# Patient Record
Sex: FEMALE | Race: WHITE | NOT HISPANIC OR LATINO | URBAN - METROPOLITAN AREA
[De-identification: names, ages, dates, MRNs, and addresses within clinical notes are randomized per-mention and may not be internally consistent; named-entity substitution may affect disease eponyms.]

---

## 2020-07-02 ENCOUNTER — LAB OUTSIDE AN ENCOUNTER (OUTPATIENT)
Dept: URBAN - METROPOLITAN AREA CLINIC 78 | Facility: CLINIC | Age: 45
End: 2020-07-02

## 2020-07-02 ENCOUNTER — OFFICE VISIT (OUTPATIENT)
Dept: URBAN - METROPOLITAN AREA CLINIC 78 | Facility: CLINIC | Age: 45
End: 2020-07-02
Payer: COMMERCIAL

## 2020-07-02 DIAGNOSIS — K12.1 MOUTH ULCERS: ICD-10-CM

## 2020-07-02 DIAGNOSIS — R10.9 ABDOMINAL CRAMPS: ICD-10-CM

## 2020-07-02 DIAGNOSIS — R14.0 BLOATING: ICD-10-CM

## 2020-07-02 DIAGNOSIS — R15.9 FECAL INCONTINENCE: ICD-10-CM

## 2020-07-02 DIAGNOSIS — R12 HEARTBURN: ICD-10-CM

## 2020-07-02 DIAGNOSIS — R19.7 DIARRHEA: ICD-10-CM

## 2020-07-02 PROCEDURE — G9903 PT SCRN TBCO ID AS NON USER: HCPCS | Performed by: INTERNAL MEDICINE

## 2020-07-02 PROCEDURE — 82306 VITAMIN D 25 HYDROXY: CPT | Performed by: INTERNAL MEDICINE

## 2020-07-02 PROCEDURE — G8427 DOCREV CUR MEDS BY ELIG CLIN: HCPCS | Performed by: INTERNAL MEDICINE

## 2020-07-02 PROCEDURE — G8420 CALC BMI NORM PARAMETERS: HCPCS | Performed by: INTERNAL MEDICINE

## 2020-07-02 PROCEDURE — 1036F TOBACCO NON-USER: CPT | Performed by: INTERNAL MEDICINE

## 2020-07-02 PROCEDURE — 99204 OFFICE O/P NEW MOD 45 MIN: CPT | Performed by: INTERNAL MEDICINE

## 2020-07-02 RX ORDER — CHOLESTYRAMINE 4 G/9G
1 PACKET MIXED WITH WATER OR NON-CARBONATED DRINK POWDER, FOR SUSPENSION ORAL ONCE
Refills: 1 | OUTPATIENT
Start: 2020-07-02

## 2020-07-02 RX ORDER — SODIUM, POTASSIUM,MAG SULFATES 17.5-3.13G
354 ML SOLUTION, RECONSTITUTED, ORAL ORAL
Qty: 1 | Refills: 0 | OUTPATIENT
Start: 2020-07-02

## 2020-07-02 NOTE — HPI-TODAY'S VISIT:
The patient has had digestive issues dating back to . She describes mainly abdominal cramping preceding loose stools. More lately she has had issues with fecal incontinence. She has not seen blood in the stools. Appetite has been good, but she is afraid of what to eat. She is interested in getting a food allergy test. She tends to have several loose stools daily usually in the morning hours. She tends to get bloated and headaches when she consumes dairy, peanuts, bread and pasta. She cannot drink carbonated beverages. She denies any nausea or vomiting. But when she eats certain foods (ie. sandwich or fried foods) she does feel nauseous and will end up vomiting. She has also noticed some heartburn based on certain foods (citrics, pulled pork). No dysphagia. No unintentional weight loss. She used to be on Dicyclomine but feels that it made her lose weight dramatically,  therefore she does not want to try using antispasmodics again.  She gets frequent ulcers in the back of her throat and mouth. To her knowledge she has never been tested for celiac sprue. She had a 'flare up' of abdominal pain about 2 years ago at which time she underwent both EGD and colonoscopy during a hospitalization. She was told that everything looked normal. She cannot recall the name of the GI who performed these tests. Review of [facility] or [facility] records do not reveal any prior endoscopic procedures.  A few years ago, she underwent testing for GB and recalls that a HIDA delatorre was abnormal. She wonders if her symptoms could be due to gallbladder disease.     She does not feel as though she is under increased stress currently.  She is a runner. She had 2 knee surgeries in the past 7 months.  She has excercise induced asthma.  There is no FH of celiac sprue. Her GM had both pancreatic and colon cancer.

## 2020-07-07 LAB
A/G RATIO: 2.2
ALBUMIN: 4.8
ALKALINE PHOSPHATASE: 53
ALT (SGPT): 10
AST (SGOT): 18
BILIRUBIN, TOTAL: 0.9
BUN/CREATININE RATIO: 14
BUN: 12
CALCIUM: 9.6
CARBON DIOXIDE, TOTAL: 23
CHLORIDE: 101
CREATININE: 0.84
EGFR IF AFRICN AM: 97
EGFR IF NONAFRICN AM: 84
F001-IGE EGG WHITE: <0.1
F002-IGE MILK: <0.1
F003-IGE CODFISH: <0.1
F004-IGE WHEAT: <0.1
F008-IGE CORN: <0.1
F010-IGE SESAME SEED: <0.1
F013-IGE PEANUT: <0.1
F014-IGE SOYBEAN: <0.1
F024-IGE SHRIMP: <0.1
F207-IGE CLAM: <0.1
F256-IGE WALNUT: <0.1
F338-IGE SCALLOP: <0.1
GLOBULIN, TOTAL: 2.2
GLUCOSE: 100
HEMATOCRIT: 41.5
HEMOGLOBIN: 14
Lab: (no result)
MCH: 31.8
MCHC: 33.7
MCV: 94
NRBC: (no result)
PLATELETS: 254
POTASSIUM: 4.8
PROTEIN, TOTAL: 7
RBC: 4.4
RDW: 11.8
SODIUM: 137
TSH: 1.66
VITAMIN D, 25-HYDROXY: 41.6
WBC: 6.5

## 2020-07-15 ENCOUNTER — OFFICE VISIT (OUTPATIENT)
Dept: URBAN - METROPOLITAN AREA SURGERY CENTER 15 | Facility: SURGERY CENTER | Age: 45
End: 2020-07-15
Payer: COMMERCIAL

## 2020-07-15 ENCOUNTER — TELEPHONE ENCOUNTER (OUTPATIENT)
Dept: URBAN - METROPOLITAN AREA CLINIC 78 | Facility: CLINIC | Age: 45
End: 2020-07-15

## 2020-07-15 DIAGNOSIS — D12.2 ADENOMATOUS POLYP OF ASCENDING COLON: ICD-10-CM

## 2020-07-15 DIAGNOSIS — K31.89 ACQUIRED DEFORMITY OF PYLORUS: ICD-10-CM

## 2020-07-15 DIAGNOSIS — K62.1 DYSPLASTIC POLYP OF RECTUM: ICD-10-CM

## 2020-07-15 DIAGNOSIS — R19.7 ACUTE DIARRHEA: ICD-10-CM

## 2020-07-15 DIAGNOSIS — K29.60 ADENOPAPILLOMATOSIS GASTRICA: ICD-10-CM

## 2020-07-15 PROCEDURE — 45385 COLONOSCOPY W/LESION REMOVAL: CPT | Performed by: INTERNAL MEDICINE

## 2020-07-15 PROCEDURE — G8907 PT DOC NO EVENTS ON DISCHARG: HCPCS | Performed by: INTERNAL MEDICINE

## 2020-07-15 PROCEDURE — 43239 EGD BIOPSY SINGLE/MULTIPLE: CPT | Performed by: INTERNAL MEDICINE

## 2020-07-15 PROCEDURE — G9937 DIG OR SURV COLSCO: HCPCS | Performed by: INTERNAL MEDICINE

## 2020-07-15 PROCEDURE — 45380 COLONOSCOPY AND BIOPSY: CPT | Performed by: INTERNAL MEDICINE

## 2020-07-15 RX ORDER — CHOLESTYRAMINE 4 G/9G
1 PACKET MIXED WITH WATER OR NON-CARBONATED DRINK POWDER, FOR SUSPENSION ORAL ONCE
Refills: 1 | Status: ACTIVE | COMMUNITY
Start: 2020-07-02

## 2020-07-15 RX ORDER — FAMOTIDINE 20 MG/1
1 TABLET TABLET, FILM COATED ORAL
Qty: 60 | Refills: 1 | OUTPATIENT

## 2020-07-15 RX ORDER — SODIUM, POTASSIUM,MAG SULFATES 17.5-3.13G
354 ML SOLUTION, RECONSTITUTED, ORAL ORAL
Qty: 1 | Refills: 0 | Status: ACTIVE | COMMUNITY
Start: 2020-07-02

## 2020-07-28 ENCOUNTER — OFFICE VISIT (OUTPATIENT)
Dept: URBAN - METROPOLITAN AREA TELEHEALTH 2 | Facility: TELEHEALTH | Age: 45
End: 2020-07-28
Payer: COMMERCIAL

## 2020-07-28 DIAGNOSIS — K58.0 IRRITABLE BOWEL SYNDROME WITH DIARRHEA: ICD-10-CM

## 2020-07-28 DIAGNOSIS — R12 HEARTBURN: ICD-10-CM

## 2020-07-28 DIAGNOSIS — K12.1 MOUTH ULCERS: ICD-10-CM

## 2020-07-28 DIAGNOSIS — Z86.010 PERSONAL HISTORY OF COLONIC POLYPS: ICD-10-CM

## 2020-07-28 PROCEDURE — 99214 OFFICE O/P EST MOD 30 MIN: CPT | Performed by: INTERNAL MEDICINE

## 2020-07-28 RX ORDER — CHOLESTYRAMINE 4 G/9G
1 PACKET MIXED WITH WATER OR NON-CARBONATED DRINK POWDER, FOR SUSPENSION ORAL ONCE
Refills: 1 | Status: ACTIVE | COMMUNITY
Start: 2020-07-02

## 2020-07-28 RX ORDER — RIFAXIMIN 200 MG/1
2 TABLETS TABLET ORAL THREE TIMES A DAY
Qty: 84 TABLET | Refills: 0 | OUTPATIENT
Start: 2020-07-28

## 2020-07-28 RX ORDER — RIFAXIMIN 550 MG/1
1 TABLET TABLET ORAL THREE TIMES A DAY
Qty: 42 TABLET | Refills: 2 | OUTPATIENT
Start: 2020-07-28 | End: 2020-09-08

## 2020-07-28 RX ORDER — SODIUM, POTASSIUM,MAG SULFATES 17.5-3.13G
354 ML SOLUTION, RECONSTITUTED, ORAL ORAL
Qty: 1 | Refills: 0 | Status: ACTIVE | COMMUNITY
Start: 2020-07-02

## 2020-07-28 RX ORDER — FAMOTIDINE 20 MG/1
1 TABLET TABLET, FILM COATED ORAL
Qty: 60 | Refills: 1 | Status: ACTIVE | COMMUNITY

## 2020-07-28 NOTE — HPI-TODAY'S VISIT:
The patient has had digestive issues dating back to HS. She describes mainly abdominal cramping preceding loose stools. More lately she has had issues with fecal incontinence. She has not seen blood in the stools. Appetite has been good, but she is afraid of what to eat. She is interested in getting a food allergy test. She tends to have several loose stools daily usually in the morning hours. She tends to get bloated and headaches when she consumes dairy, peanuts, bread and pasta. She cannot drink carbonated beverages. She denies any nausea or vomiting. But when she eats certain foods (ie. sandwich or fried foods) she does feel nauseous and will end up vomiting. She has also noticed some heartburn based on certain foods (citrics, pulled pork). No dysphagia. No unintentional weight loss. She used to be on Dicyclomine but feels that it made her lose weight dramatically,  therefore she does not want to try using antispasmodics again.  She gets frequent ulcers in the back of her throat and mouth.  She had a 'flare up' of abdominal pain about 2 years ago at which time she underwent both EGD and colonoscopy during a hospitalization. She was told that everything looked normal. A few years ago, she underwent testing for GB and recalls that a HIDA delatorre was abnormal. She wonders if her symptoms could be due to gallbladder disease.  [place] we reviewed extensively the results of her E/C and path.  She has been feeling extreme bloating. She has a couple of BM's early in the morning. If she has dairy, gluten, sugars, or fatty foods, her GI symptoms are exacerbated..   She does not feel as though she is under increased stress currently.  She is a runner. She had 2 knee surgeries in the past 7 months.  She has excercise induced asthma.  There is no FH of celiac sprue. Her GM had both pancreatic and colon cancer.   Summary of prior workup: - E/C by me on 7/15/20 revealed a normal upper GI tract except for antral erosions. Biopsies were neg for H pylori or celiac sprue. Esoph bxs were unremarkable. On colonoscopy, there were 3 polyps in the asc (a combination of HP and TA's), an 11mm transverse HP polyp and a 4mm rectal HP polyp. Random colon biopsies were neg for microscopic colitis.

## 2021-04-28 ENCOUNTER — TELEPHONE ENCOUNTER (OUTPATIENT)
Dept: URBAN - METROPOLITAN AREA CLINIC 78 | Facility: CLINIC | Age: 46
End: 2021-04-28

## 2021-04-28 RX ORDER — HYOSCYAMINE SULFATE 0.12 MG/1
1 TABLET UNDER THE TONGUE AND ALLOW TO DISSOLVE TABLET, ORALLY DISINTEGRATING ORAL
Qty: 40 | Refills: 2 | OUTPATIENT
Start: 2021-04-29 | End: 2022-01-23

## 2022-05-10 ENCOUNTER — TELEPHONE ENCOUNTER (OUTPATIENT)
Dept: URBAN - METROPOLITAN AREA CLINIC 78 | Facility: CLINIC | Age: 47
End: 2022-05-10

## 2022-05-10 RX ORDER — CHOLESTYRAMINE 4 G/9G
1 PACKET MIXED WITH WATER OR NON-CARBONATED DRINK POWDER, FOR SUSPENSION ORAL ONCE
Qty: 30 | Refills: 0

## 2023-10-26 ENCOUNTER — OFFICE VISIT (OUTPATIENT)
Dept: URBAN - METROPOLITAN AREA CLINIC 78 | Facility: CLINIC | Age: 48
End: 2023-10-26
Payer: COMMERCIAL

## 2023-10-26 ENCOUNTER — LAB OUTSIDE AN ENCOUNTER (OUTPATIENT)
Dept: URBAN - METROPOLITAN AREA CLINIC 78 | Facility: CLINIC | Age: 48
End: 2023-10-26

## 2023-10-26 ENCOUNTER — DASHBOARD ENCOUNTERS (OUTPATIENT)
Age: 48
End: 2023-10-26

## 2023-10-26 VITALS
WEIGHT: 177.87 LBS | DIASTOLIC BLOOD PRESSURE: 78 MMHG | TEMPERATURE: 98.1 F | HEART RATE: 57 BPM | BODY MASS INDEX: 24.09 KG/M2 | HEIGHT: 72 IN | RESPIRATION RATE: 16 BRPM | SYSTOLIC BLOOD PRESSURE: 118 MMHG

## 2023-10-26 DIAGNOSIS — R10.84 ABDOMINAL CRAMPING, GENERALIZED: ICD-10-CM

## 2023-10-26 DIAGNOSIS — R19.7 DIARRHEA: ICD-10-CM

## 2023-10-26 DIAGNOSIS — R15.9 FECAL INCONTINENCE: ICD-10-CM

## 2023-10-26 DIAGNOSIS — R14.0 BLOATING: ICD-10-CM

## 2023-10-26 PROCEDURE — 99214 OFFICE O/P EST MOD 30 MIN: CPT | Performed by: INTERNAL MEDICINE

## 2023-10-26 RX ORDER — SODIUM, POTASSIUM,MAG SULFATES 17.5-3.13G
354 ML SOLUTION, RECONSTITUTED, ORAL ORAL
Qty: 1 | Refills: 0 | Status: ON HOLD | COMMUNITY
Start: 2020-07-02

## 2023-10-26 RX ORDER — FAMOTIDINE 20 MG/1
1 TABLET TABLET, FILM COATED ORAL
Qty: 60 | Refills: 1 | Status: ACTIVE | COMMUNITY

## 2023-10-26 RX ORDER — CHOLESTYRAMINE 4 G/9G
1 PACKET MIXED WITH WATER OR NON-CARBONATED DRINK POWDER, FOR SUSPENSION ORAL ONCE
Qty: 30 | Refills: 0

## 2023-10-26 RX ORDER — CHOLESTYRAMINE 4 G/9G
1 PACKET MIXED WITH WATER OR NON-CARBONATED DRINK POWDER, FOR SUSPENSION ORAL ONCE
Qty: 30 | Refills: 0 | Status: ACTIVE | COMMUNITY

## 2023-10-26 RX ORDER — RIFAXIMIN 200 MG/1
2 TABLETS TABLET ORAL THREE TIMES A DAY
Qty: 84 TABLET | Refills: 0 | OUTPATIENT

## 2023-10-26 RX ORDER — RIFAXIMIN 200 MG/1
2 TABLETS TABLET ORAL THREE TIMES A DAY
Qty: 84 TABLET | Refills: 0 | Status: ACTIVE | COMMUNITY
Start: 2020-07-28

## 2023-10-26 RX ORDER — HYOSCYAMINE SULFATE 0.12 MG/1
1 TABLET UNDER THE TONGUE AND ALLOW TO DISSOLVE TABLET, ORALLY DISINTEGRATING ORAL
Qty: 40 | Refills: 2 | OUTPATIENT
Start: 2023-10-26 | End: 2024-07-22

## 2023-10-26 NOTE — HPI-TODAY'S VISIT:
The patient has had digestive issues dating back to HS. She describes mainly abdominal cramping preceding loose stools. More lately she has had issues with fecal incontinence. She has not seen blood in the stools. Appetite has been good, but she is afraid of what to eat. She is interested in getting a food allergy test. She tends to have several loose stools daily usually in the morning hours. She tends to get bloated and headaches when she consumes dairy, peanuts, bread and pasta. She cannot drink carbonated beverages. She denies any nausea or vomiting. But when she eats certain foods (ie. sandwich or fried foods) she does feel nauseous and will end up vomiting. She has also noticed some heartburn based on certain foods (citrics, pulled pork). No dysphagia. No unintentional weight loss. She used to be on Dicyclomine but feels that it made her lose weight dramatically,  therefore she does not want to try using antispasmodics again.  She gets frequent ulcers in the back of her throat and mouth.   A few years ago, she underwent testing for GB and recalls that a HIDA delatorre was abnormal. She wonders if her symptoms could be due to gallbladder disease.  Brid[facility] we once again reviewed extensively the results of her E/C and path.  She comes in today stating she had a bad weekend. She has been under increased stress. She had an episdoe of severe pain. She has been experiencing fecal urgency and episodes of incontinence.  She has not been using the cholestyramine on a regular basis but just as prn.    She has been feeling extreme bloating.  She does not recall if she ever took the Xifaxan I ordered in 2020. If she has dairy, gluten, sugars, or fatty foods, her GI symptoms are exacerbated.  She was evaluated by a urogynecologist who told her she has a ureterocele.  She believes he told her that the intestine/colon were "loose "as well.  She has never seen a colorectal surgeon  for evaluation of rectocele.  She does not feel as though she is under increased stress currently.  She is a runner. She had 2 knee surgeries in the past 7 months.  She has excercise induced asthma.  There is no FH of celiac sprue. Her GM had both pancreatic and colon cancer.   Summary of prior workup: - E/C by me on 7/15/20 revealed a normal upper GI tract except for antral erosions. Biopsies were neg for H pylori or celiac sprue. Esoph bxs were unremarkable. On colonoscopy, there were 3 polyps in the asc (a combination of HP and TA's), an 11mm transverse HP polyp and a 4mm rectal HP polyp. Random colon biopsies were neg for microscopic colitis.

## 2023-11-08 ENCOUNTER — OFFICE VISIT (OUTPATIENT)
Dept: URBAN - METROPOLITAN AREA CLINIC 77 | Facility: CLINIC | Age: 48
End: 2023-11-08
Payer: COMMERCIAL

## 2023-11-08 DIAGNOSIS — K82.8 DYSKINESIA OF GALLBLADDER: ICD-10-CM

## 2023-11-08 PROCEDURE — 76705 ECHO EXAM OF ABDOMEN: CPT | Performed by: INTERNAL MEDICINE

## 2024-02-05 ENCOUNTER — OV EP (OUTPATIENT)
Dept: URBAN - METROPOLITAN AREA CLINIC 78 | Facility: CLINIC | Age: 49
End: 2024-02-05